# Patient Record
Sex: MALE | Race: WHITE | NOT HISPANIC OR LATINO | Employment: FULL TIME | URBAN - METROPOLITAN AREA
[De-identification: names, ages, dates, MRNs, and addresses within clinical notes are randomized per-mention and may not be internally consistent; named-entity substitution may affect disease eponyms.]

---

## 2017-01-12 ENCOUNTER — OFFICE VISIT (OUTPATIENT)
Dept: ORTHOPEDIC SURGERY | Facility: CLINIC | Age: 47
End: 2017-01-12

## 2017-01-12 VITALS — HEIGHT: 72 IN | BODY MASS INDEX: 42.66 KG/M2 | WEIGHT: 315 LBS | TEMPERATURE: 98 F

## 2017-01-12 DIAGNOSIS — S92.351A JONES FRACTURE, RIGHT, CLOSED, INITIAL ENCOUNTER: Primary | ICD-10-CM

## 2017-01-12 PROCEDURE — 73630 X-RAY EXAM OF FOOT: CPT | Performed by: ORTHOPAEDIC SURGERY

## 2017-01-12 PROCEDURE — 99024 POSTOP FOLLOW-UP VISIT: CPT | Performed by: ORTHOPAEDIC SURGERY

## 2017-01-12 NOTE — PROGRESS NOTES
Patient:  Sanjeev Farias is a 46 y.o. male    Chief Complaint/ Reason for Visit:    Chief Complaint   Patient presents with   • Right Foot - Pain, Follow-up       HPI:  The patient returns today and says that the pain and the swelling in his right foot have both improved.  In fact, he says that he has no pain at all in the right foot when weightbearing.  He has weaned himself from the crutches over the past couple of days.  He has not had any calf pain, chest pain, or shortness of breath.  He has not had fevers, chills, sweats, or shakes, and is not been recently systemically ill.  He has been compliant with my instructions for protecting his foot.    He sustained an on-the-job injury a few weeks ago when he was carrying a 50 pound box and was going up some stairs at work and twisted his right foot, feeling a pop in the immediate onset of sharp pain and swelling.      PMH:    Past Medical History   Diagnosis Date   • MRSA (methicillin resistant Staphylococcus aureus)        PSH:    Past Surgical History   Procedure Laterality Date   • Shoulder surgery     • Vasectomy     • Gastric banding     • Gastric banding removal         Social Hx:    Social History     Social History   • Marital status:      Spouse name: N/A   • Number of children: N/A   • Years of education: N/A     Occupational History   • Not on file.     Social History Main Topics   • Smoking status: Never Smoker   • Smokeless tobacco: Former User     Types: Chew   • Alcohol use Yes      Comment: occasional   • Drug use: No   • Sexual activity: Not on file     Other Topics Concern   • Not on file     Social History Narrative       Family Hx:    Family History   Problem Relation Age of Onset   • COPD Mother    • Cancer Father        Meds:    Current Outpatient Prescriptions:   •  diclofenac (VOLTAREN) 75 MG EC tablet, , Disp: , Rfl:   •  VICODIN 5-300 MG per tablet, Take 1 tablet by mouth Every 6 (Six) Hours As Needed for moderate pain (4-6)., Disp:  "50 tablet, Rfl: 0    Allergies:  No Known Allergies    ROS:  Review of Systems    Vitals:    01/12/17 1006   Temp: 98 °F (36.7 °C)   Weight: (!) 340 lb (154 kg)   Height: 72\" (182.9 cm)       Physical Exam    The patient is awake, alert, and oriented ×3.  The patient is in no acute distress.  Breathing is regular and unlabored with a respiratory rate of 12/m.  Extraocular movements and pupillary responses are symmetrically intact. Sclerae are anicteric.   Hearing is within normal limits.  Speech is within normal limits.  There is no jugular venous distention.    His right foot is inspected.  There is still mild swelling.  Has no tenderness in the metadiaphyseal region of the proximal right fifth metatarsal.  His right calf is soft and nontender, though he does have swelling in his right lower leg from the mid leg distally including the foot and ankle.  Sensory exam intact light touch in all toes, pedal pulses are intact and regular with normal amplitude, and a current heart rate of about 72 bpm.        Radiology: X-rays: 3 views of the right foot were ordered and reviewed today to assess fracture healing of the Leal type fracture of the right fifth metatarsal.  These were compared to the previous images.  The images today reveal the fracture is continuing to heal radiographically, though healing is not yet 100%.      Assessment:  1. Leal fracture, right, closed, subsequent encounter    - XR Foot 3+ View Right  - Duplex Venous Lower Extremity - Right CAR; Future              Plan:  I discussed everything with the patient at length.  I explained that the fracture had progressed and healing, but was not yet 100% healed.  I encouraged him to continue to use his crutches.  He voiced understanding.  I do not think there is any way he could safely return to full duty at a [employer].    Otherwise, he is going to continue his boot, and we will see him back in about a month for recheck.  All work restrictions remain in " force.  Since this is his right foot, he may not drive.  The patient voiced understanding.  We will need x-rays, 3 views of the right foot out of the boot, at follow-up.      Orders Placed This Encounter   Procedures   • XR Foot 3+ View Right     Order Specific Question:   Reason for Exam:     Answer:   f/u rt. foot

## 2017-01-12 NOTE — MR AVS SNAPSHOT
Sanjeev Farias   1/12/2017 9:30 AM   Office Visit    Dept Phone:  [phone]   Encounter #:  [id number]    Provider:  Timmy Villafana MD   Department:  [facility]                Your Full Care Plan              Your Updated Medication List          This list is accurate as of: 1/12/17 10:46 AM.  Always use your most recent med list.                diclofenac 75 MG EC tablet   Commonly known as:  VOLTAREN       VICODIN 5-300 MG per tablet   Generic drug:  Hydrocodone-Acetaminophen   Take 1 tablet by mouth Every 6 (Six) Hours As Needed for moderate pain (4-6).               We Performed the Following     XR Foot 3+ View Right       You Were Diagnosed With        Codes Comments    Leal fracture, right, closed, initial encounter    -  Primary ICD-10-CM: S92.351A  ICD-9-CM: 825.25       Instructions     None    Patient Instructions History      Upcoming Appointments     Visit Type Date Time Department    FOLLOW UP 1/12/2017  9:30 AM [facility]    FOLLOW UP 2/9/2017  9:40 AM [facility]      [employer] Signup     Our records indicate that you have an active [employer] account.    You can view your After Visit Summary by going to [employer] and logging in with your [employer] username and password.  If you don't have a [employer] username and password but a parent or guardian has access to your record, the parent or guardian should login with their own [employer] username and password and access your record to view the After Visit Summary.    If you have questions, you can email [email or web address] or call [phone] to talk to our [employer] staff.  Remember, [employer] is NOT to be used for urgent needs.  For medical emergencies, dial 911.               Other Info from Your Visit           Your Appointments     Feb 09, 2017  9:40 AM EST   Follow Up with Timmy Villafana MD   [facility]  "[facility] (--)    [address] [place] [address]   [phone]           Arrive 15 minutes prior to appointment.              Allergies     No Known Allergies      Reason for Visit     Right Foot - Pain, Follow-up           Vital Signs     Temperature Height Weight Body Mass Index Smoking Status       98 °F (36.7 °C) 72\" (182.9 cm) 340 lb (154 kg) 46.11 kg/m2 Never Smoker       Problems and Diagnoses Noted     Broken foot        "

## 2017-02-09 ENCOUNTER — OFFICE VISIT (OUTPATIENT)
Dept: ORTHOPEDIC SURGERY | Facility: CLINIC | Age: 47
End: 2017-02-09

## 2017-02-09 VITALS — BODY MASS INDEX: 45.1 KG/M2 | WEIGHT: 315 LBS | HEIGHT: 70 IN | TEMPERATURE: 98.6 F

## 2017-02-09 DIAGNOSIS — S92.351G: Primary | ICD-10-CM

## 2017-02-09 PROCEDURE — 73630 X-RAY EXAM OF FOOT: CPT | Performed by: ORTHOPAEDIC SURGERY

## 2017-02-09 PROCEDURE — 99024 POSTOP FOLLOW-UP VISIT: CPT | Performed by: ORTHOPAEDIC SURGERY

## 2017-02-09 RX ORDER — PHENTERMINE HYDROCHLORIDE 30 MG/1
30 CAPSULE ORAL EVERY MORNING
COMMUNITY
End: 2017-04-17

## 2017-02-09 NOTE — PROGRESS NOTES
Patient:  Sanjeev Farias is a 47 y.o. male    Chief Complaint/ Reason for Visit:    Chief Complaint   Patient presents with   • Right Foot - Fracture, Follow-up       HPI:  The patient returns today for scheduled follow-up on his right foot fifth metatarsal Leal fracture.  It has now been 3 months, perhaps a little more, since his injury, which occurred on the job.  He says that he has little to no pain.  He has had no calf pain, chest pain, or shortness of breath.  He has been compliant with my instructions and restrictions.  He has been off work as his restrictions could not be accommodated.      PMH:    Past Medical History   Diagnosis Date   • MRSA (methicillin resistant Staphylococcus aureus)        PSH:    Past Surgical History   Procedure Laterality Date   • Shoulder surgery     • Vasectomy     • Gastric banding     • Gastric banding removal         Social Hx:    Social History     Social History   • Marital status:      Spouse name: N/A   • Number of children: N/A   • Years of education: N/A     Occupational History   • Not on file.     Social History Main Topics   • Smoking status: Never Smoker   • Smokeless tobacco: Former User     Types: Chew   • Alcohol use Yes      Comment: occasional   • Drug use: No   • Sexual activity: Defer     Other Topics Concern   • Not on file     Social History Narrative       Family Hx:    Family History   Problem Relation Age of Onset   • COPD Mother    • Cancer Father        Meds:    Current Outpatient Prescriptions:   •  phentermine 30 MG capsule, Take 30 mg by mouth Every Morning., Disp: , Rfl:   •  diclofenac (VOLTAREN) 75 MG EC tablet, , Disp: , Rfl:   •  VICODIN 5-300 MG per tablet, Take 1 tablet by mouth Every 6 (Six) Hours As Needed for moderate pain (4-6)., Disp: 50 tablet, Rfl: 0    Allergies:  No Known Allergies    ROS:  Review of Systems    Vitals:    02/09/17 1038   Temp: 98.6 °F (37 °C)   TempSrc: Temporal Artery    Weight: (!) 341 lb 9.6 oz (155 kg)  "  Height: 70\" (177.8 cm)       Physical Exam    The patient is awake, alert, and oriented ×3.  The patient is in no acute distress.  Breathing is regular and unlabored with a respiratory rate of 14/m.  Extraocular movements and pupillary responses are symmetrically intact. Sclerae are anicteric.   Hearing is within normal limits.  Speech is within normal limits.  There is no jugular venous distention.    His right foot has minimal swelling.  He has no tenderness, crepitus, or instability and near the fifth metatarsal Leal fracture.  Alignment, rotation, and cascade of his toes appear normal.  Pedal pulses are intact and regular.  Sensory exam intact light touch in all toes.  His calf is soft and nontender with no venous cord.    Radiology: X-rays: 3 views of the right foot were ordered and reviewed today to assess fracture alignment and healing.  These images were compared to multiple series of images previously here in the office.  The fracture has continued to heal, but it is not yet 100% healed.    Labs:      Assessment:  1. Leal fracture, right, with delayed healing, subsequent encounter    - XR Foot 3+ View Right          Plan:  I discussed everything with the patient, and, in fact, reviewed the images with him personally.  I explained that he was healing this fracture but there was not all the way healed.  I do not think he could safely return to his rigorous job full duty at this time.  We certainly don't want to come this close to healing the fracture, and have him reinjure it.    Technically, he has delayed healing.  However, I've reassured him that this is the rule, rather than the exception, with Leal fractures.  Obviously, he would like to avoid surgery if possible.  We are therefore going to have to continue his boot and activity and weightbearing restrictions.  He may not return to work unless they can provide him with sedentary duty.  I will see him back in another 5 weeks and we will need x-rays, " 3 views of the right foot.    Orders Placed This Encounter   Procedures   • XR Foot 3+ View Right     Order Specific Question:   Reason for Exam:     Answer:   f/u right foot

## 2017-03-20 ENCOUNTER — OFFICE VISIT (OUTPATIENT)
Dept: ORTHOPEDIC SURGERY | Facility: CLINIC | Age: 47
End: 2017-03-20

## 2017-03-20 VITALS — TEMPERATURE: 99.4 F | WEIGHT: 315 LBS | HEIGHT: 70 IN | BODY MASS INDEX: 45.1 KG/M2

## 2017-03-20 DIAGNOSIS — S92.351D: Primary | ICD-10-CM

## 2017-03-20 PROCEDURE — 99213 OFFICE O/P EST LOW 20 MIN: CPT | Performed by: ORTHOPAEDIC SURGERY

## 2017-03-20 PROCEDURE — 73630 X-RAY EXAM OF FOOT: CPT | Performed by: ORTHOPAEDIC SURGERY

## 2017-03-20 NOTE — PROGRESS NOTES
Patient:  Sanjeev Farias is a 47 y.o. male    Chief Complaint/ Reason for Visit:    Chief Complaint   Patient presents with   • Right Foot - Follow-up       HPI:  The patient returns today, accompanied by his work comp  nurse, for a scheduled follow-up on his right foot Leal fracture.  It is now been about 4-1/2 months since he sustained this injury in an on-the-job event.  His pain has continued to improve, but not as much since the last visit as it had between the previous 2 visits.  He still is very comfortable in his boot but is very concerned when he walks without it, such as going to the shower in the morning, that he still has some discomfort laterally.  He has not had any calf pain, chest pain, or shortness of breath.    He has a very strenuous, physical job and is concerned that his foot may not yet be ready to return.  He has an understandable concerned about refracturing the right fifth metatarsal, and really wants to avoid that and the likely surgery that it would require should this occur.          PMH:    Past Medical History   Diagnosis Date   • MRSA (methicillin resistant Staphylococcus aureus)        PSH:    Past Surgical History   Procedure Laterality Date   • Shoulder surgery     • Vasectomy     • Gastric banding     • Gastric banding removal         Social Hx:    Social History     Social History   • Marital status:      Spouse name: N/A   • Number of children: N/A   • Years of education: N/A     Occupational History   • Not on file.     Social History Main Topics   • Smoking status: Never Smoker   • Smokeless tobacco: Former User     Types: Chew   • Alcohol use Yes      Comment: occasional   • Drug use: No   • Sexual activity: Defer     Other Topics Concern   • Not on file     Social History Narrative       Family Hx:    Family History   Problem Relation Age of Onset   • COPD Mother    • Cancer Father        Meds:    Current Outpatient Prescriptions:   •  phentermine 30 MG  "capsule, Take 30 mg by mouth Every Morning., Disp: , Rfl:   •  diclofenac (VOLTAREN) 75 MG EC tablet, , Disp: , Rfl:   •  VICODIN 5-300 MG per tablet, Take 1 tablet by mouth Every 6 (Six) Hours As Needed for moderate pain (4-6)., Disp: 50 tablet, Rfl: 0    Allergies:  No Known Allergies    ROS:  Review of Systems    Vitals:    03/20/17 1126   Temp: 99.4 °F (37.4 °C)   TempSrc: Temporal Artery    Weight: (!) 341 lb (155 kg)   Height: 70\" (177.8 cm)       Physical Exam    The patient is awake, alert, and oriented ×3.  The patient is in no acute distress.  Breathing is regular and unlabored with a respiratory rate of 14/m.  Extraocular movements and pupillary responses are symmetrically intact. Sclerae are anicteric.   Hearing is within normal limits.  Speech is within normal limits.  There is no jugular venous distention.    His right foot is inspected.  There is no swelling.  His right calf is soft, nontender, with no venous cord.  He has expected muscular atrophy to a mild degree.  Alignment, rotation, and cascade of the toes in the right foot are all appear normal.  There is no crepitus or instability, and in fact, no tenderness in the area of the proximal right fifth metatarsal.  He has no tenderness elsewhere.  Pulses and sensory exam remain intact in the right lower extremity.        Radiology: X-rays: 3 views of the right foot were ordered and reviewed to assess fracture alignment and healing, and were compared to multiple series of images of the right foot also taken here in the office during past visits.  The right fifth metatarsal Leal fracture has almost completely healed.  There has been subtle progression of healing noted when I compared today's images to the previous images from the last visit.  There is been considerable healing notable on today's images compared to the images from January.          Assessment:  1. Leal fracture, right, with routine healing, subsequent encounter    - XR Foot 3+ View " Right  - Ambulatory Referral to Physical Therapy Evaluate and treat, Ortho          Plan:  I discussed everything with the patient and his work comp nurse.  I reviewed the x-rays personally with the patient, and showed him the comparison images, explaining everything to him, and answering all of his questions.    He has a very strenuous, vigorous job, and is reluctant to return yet.  I agree that we should keep him off work for probably another month, and begin some work hardening with physical therapy.  He is in agreement, as is his work comp nurse.    Appropriate referral was generated.    I'll see him back in about a month in hopes of returning him to work at that time.    Orders Placed This Encounter   Procedures   • XR Foot 3+ View Right     Order Specific Question:   Reason for Exam:     Answer:   foot   • Ambulatory Referral to Physical Therapy Evaluate and treat, Ortho     Referral Priority:   Routine     Referral Type:   Therapy     Referral Reason:   Specialty Services Required     Referral Location:   [facility]     Requested Specialty:   Physical Therapy     Number of Visits Requested:   1

## 2017-03-23 DIAGNOSIS — S92.351D: Primary | ICD-10-CM

## 2017-04-17 ENCOUNTER — OFFICE VISIT (OUTPATIENT)
Dept: ORTHOPEDIC SURGERY | Facility: CLINIC | Age: 47
End: 2017-04-17

## 2017-04-17 VITALS — WEIGHT: 315 LBS | HEIGHT: 70 IN | BODY MASS INDEX: 45.1 KG/M2 | TEMPERATURE: 98.4 F

## 2017-04-17 DIAGNOSIS — S92.351D: Primary | ICD-10-CM

## 2017-04-17 PROCEDURE — 73630 X-RAY EXAM OF FOOT: CPT | Performed by: ORTHOPAEDIC SURGERY

## 2017-04-17 PROCEDURE — 99213 OFFICE O/P EST LOW 20 MIN: CPT | Performed by: ORTHOPAEDIC SURGERY

## 2017-04-17 NOTE — PROGRESS NOTES
"Patient:  Sanjeev Farias is a 47 y.o. male    Chief Complaint/ Reason for Visit:    Chief Complaint   Patient presents with   • Right Foot - Follow-up       HPI:  Patient is here with his work comp nurse, Aida Villalobos, for scheduled follow-up on the work comp Leal fracture he sustained to his right foot.  He says he has no pain.  He has been doing well physical therapy.  He does desire to return to work.  He has had no calf pain, chest pain, or shortness of breath.    He does work in a [employer].      PMH:    Past Medical History:   Diagnosis Date   • MRSA (methicillin resistant Staphylococcus aureus)        PSH:    Past Surgical History:   Procedure Laterality Date   • GASTRIC BANDING     • GASTRIC BANDING REMOVAL     • SHOULDER SURGERY     • VASECTOMY         Social Hx:    Social History     Social History   • Marital status:      Spouse name: N/A   • Number of children: N/A   • Years of education: N/A     Occupational History   • Not on file.     Social History Main Topics   • Smoking status: Never Smoker   • Smokeless tobacco: Former User     Types: Chew   • Alcohol use Yes      Comment: occasional   • Drug use: No   • Sexual activity: Defer     Other Topics Concern   • Not on file     Social History Narrative       Family Hx:    Family History   Problem Relation Age of Onset   • COPD Mother    • Cancer Father        Meds:  No current outpatient prescriptions on file.    Allergies:  No Known Allergies    ROS:  Review of Systems    Vitals:    04/17/17 1147   Temp: 98.4 °F (36.9 °C)   TempSrc: Temporal Artery    Weight: (!) 315 lb (143 kg)   Height: 70\" (177.8 cm)       Physical Exam    The patient is awake, alert, and oriented ×3.  The patient is in no acute distress.  Breathing is regular and unlabored with a respiratory rate of 12/m.  Extraocular movements and pupillary responses are symmetrically intact. Sclerae are anicteric.   Hearing is within normal limits.  Speech is within normal " limits.  There is no jugular venous distention.    He has a lumbering gait.  He has no antalgia.  His right foot exam today is normal.  His skin is intact.  He has no crepitus.  His pulses are intact and regular.  His right calf is soft and nontender with no venous cord.        Radiology: X-rays: 3 views of his right foot were ordered and reviewed today to assess fracture alignment and healing.  I did compare these to multiple series of previous images also available in the office.  The patient has shown steady progression of healing.  That I believe the fracture of the right fifth metatarsal was now completely healed.        Assessment:  1. Leal fracture, right, with routine healing, subsequent encounter    - XR Foot 3+ View Right          Plan:  I discussed everything with the patient in the work comp nurse.  I do think it's fine for him to return to work.  I think he needs to continue physical therapy for help with balance and proprioception and agility in hopes of reducing the likelihood of future injuries.    I want to see him back in about 6 weeks for what will hopefully be a final check.

## 2017-05-18 ENCOUNTER — TELEPHONE (OUTPATIENT)
Dept: ORTHOPEDIC SURGERY | Facility: CLINIC | Age: 47
End: 2017-05-18

## 2017-05-25 ENCOUNTER — OFFICE VISIT (OUTPATIENT)
Dept: ORTHOPEDIC SURGERY | Facility: CLINIC | Age: 47
End: 2017-05-25

## 2017-05-25 VITALS — WEIGHT: 315 LBS | BODY MASS INDEX: 45.1 KG/M2 | TEMPERATURE: 98.2 F | HEIGHT: 70 IN

## 2017-05-25 DIAGNOSIS — S92.351D: Primary | ICD-10-CM

## 2017-05-25 PROCEDURE — 73630 X-RAY EXAM OF FOOT: CPT | Performed by: ORTHOPAEDIC SURGERY

## 2017-05-25 PROCEDURE — 99213 OFFICE O/P EST LOW 20 MIN: CPT | Performed by: ORTHOPAEDIC SURGERY

## 2017-07-07 ENCOUNTER — OFFICE VISIT (OUTPATIENT)
Dept: ORTHOPEDIC SURGERY | Facility: CLINIC | Age: 47
End: 2017-07-07

## 2017-07-07 VITALS — WEIGHT: 310 LBS | HEIGHT: 70 IN | BODY MASS INDEX: 44.38 KG/M2 | TEMPERATURE: 98.6 F

## 2017-07-07 DIAGNOSIS — S92.351D: Primary | ICD-10-CM

## 2017-07-07 PROCEDURE — 99213 OFFICE O/P EST LOW 20 MIN: CPT | Performed by: ORTHOPAEDIC SURGERY

## 2017-07-07 PROCEDURE — 73630 X-RAY EXAM OF FOOT: CPT | Performed by: ORTHOPAEDIC SURGERY

## 2017-07-07 NOTE — PROGRESS NOTES
"Patient:  Sanjeev Farias is a 47 y.o. male    Chief Complaint/ Reason for Visit:    Chief Complaint   Patient presents with   • Right Foot - Follow-up       HPI:  The patient is here for scheduled follow-up on the work comp injury he sustained to his right fifth metatarsal.  This was a Leal fracture which we seem to have been able to treat nonsurgically.  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  He has been able to work comfortably his work boots, and walks in the office today and athletic shoes with no limp.  He has no calf pain, chest pain, or shortness of breath.  He has not yet been fitted for his orthotics.          PMH:    Past Medical History:   Diagnosis Date   • MRSA (methicillin resistant Staphylococcus aureus)        PSH:    Past Surgical History:   Procedure Laterality Date   • GASTRIC BANDING     • GASTRIC BANDING REMOVAL     • SHOULDER SURGERY     • VASECTOMY         Social Hx:    Social History     Social History   • Marital status:      Spouse name: N/A   • Number of children: N/A   • Years of education: N/A     Occupational History   • Not on file.     Social History Main Topics   • Smoking status: Never Smoker   • Smokeless tobacco: Former User     Types: Chew   • Alcohol use Yes      Comment: occasional   • Drug use: Defer   • Sexual activity: Defer     Other Topics Concern   • Not on file     Social History Narrative       Family Hx:    Family History   Problem Relation Age of Onset   • COPD Mother    • Cancer Father        Meds:  No current outpatient prescriptions on file.    Allergies:  No Known Allergies    ROS:  Review of Systems    Vitals:    07/07/17 1114   Temp: 98.6 °F (37 °C)   Weight: (!) 310 lb (141 kg)   Height: 70\" (177.8 cm)       Physical Exam    The patient is awake, alert, and oriented ×3.  The patient is in no acute distress.  Breathing is regular and unlabored with a respiratory rate of 12/m.  Extraocular movements and pupillary " responses are symmetrically intact. Sclerae are anicteric.   Hearing is within normal limits.  Speech is within normal limits.  There is no jugular venous distention.    He has a smooth symmetrical, brisk, heel toe gait with no pronounced limp.  His right foot is inspected.  I see no abnormal swelling, bruising, or other discoloration.  He has no tenderness in the area of his fifth metatarsal fracture in the metadiaphyseal junction.    Neurovascular exam is intact and stable.      Radiology: X-rays: 3 views the patient's right foot were ordered and reviewed today to assess fracture alignment and healing.  I did compare these to the previous images from May.  On the AP and lateral views, healing appears satisfactory.  There does appear to be bridging callus.  Interestingly, on the oblique view, there appears as though there may have been some resorption of bone at the fracture site.  I'm not sure if this is a projection abnormality but it certainly is unusual.        Assessment:  1. Leal fracture, right, with routine healing, subsequent encounter    - XR Foot 3+ View Right          Plan:  I discussed everything with the patient, as well as the work comp , Aida, who is here throughout the visit.  He seems to be able to tolerate his job and the demands thereof, so I see no reason to restrict him.  However, I want to keep a close eye on his foot, especially given the difficult nature of Leal fractures.  I want to see him back in about 6 weeks.  We will need x-rays, 3 views the right foot at that time.    In the meantime, hopefully the patient will receive the orthotics we have ordered.      Orders Placed This Encounter   Procedures   • XR Foot 3+ View Right     Order Specific Question:   Reason for Exam:     Answer:   F/U RT. FOOT

## 2017-08-18 ENCOUNTER — OFFICE VISIT (OUTPATIENT)
Dept: ORTHOPEDIC SURGERY | Facility: CLINIC | Age: 47
End: 2017-08-18

## 2017-08-18 VITALS — WEIGHT: 290 LBS | TEMPERATURE: 98.2 F | BODY MASS INDEX: 40.6 KG/M2 | HEIGHT: 71 IN

## 2017-08-18 DIAGNOSIS — S92.351D: Primary | ICD-10-CM

## 2017-08-18 PROCEDURE — 73630 X-RAY EXAM OF FOOT: CPT | Performed by: ORTHOPAEDIC SURGERY

## 2017-08-18 PROCEDURE — 99213 OFFICE O/P EST LOW 20 MIN: CPT | Performed by: ORTHOPAEDIC SURGERY

## 2017-08-18 NOTE — PROGRESS NOTES
"Patient:  Sanjeev Farias is a 47 y.o. male    Chief Complaint/ Reason for Visit:    Chief Complaint   Patient presents with   • Right Foot - Follow-up       HPI:  The patient is here for scheduled follow-up on a work comp fracture of his proximal right fifth metatarsal.  He says that the doesn't have any pain per se, but says that he can still \"feel it\" when he is descending stairs.  He has a discomfort in the lateral aspect of the right foot when going downstairs.  Occasionally it happens when going up stairs.  He says that really the only time he notices the fact that he has had an injury.  He has not had any calf pain chest pain or shortness of breath.  Otherwise he can walk and do his activities daily living and can function in his job under the current circumstances without difficulty.      PMH:    Past Medical History:   Diagnosis Date   • MRSA (methicillin resistant Staphylococcus aureus)        PSH:    Past Surgical History:   Procedure Laterality Date   • GASTRIC BANDING     • GASTRIC BANDING REMOVAL     • SHOULDER SURGERY     • VASECTOMY         Social Hx:    Social History     Social History   • Marital status:      Spouse name: N/A   • Number of children: N/A   • Years of education: N/A     Occupational History   • Not on file.     Social History Main Topics   • Smoking status: Never Smoker   • Smokeless tobacco: Former User     Types: Chew   • Alcohol use Yes      Comment: occasional   • Drug use: Defer   • Sexual activity: Defer     Other Topics Concern   • Not on file     Social History Narrative       Family Hx:    Family History   Problem Relation Age of Onset   • COPD Mother    • Cancer Father        Meds:  No current outpatient prescriptions on file.    Allergies:  No Known Allergies    ROS:  Review of Systems    Vitals:    08/18/17 0759   Temp: 98.2 °F (36.8 °C)   Weight: 290 lb (132 kg)   Height: 71\" (180.3 cm)       Physical Exam    The patient is awake, alert, and oriented ×3.  The " patient is in no acute distress.  Breathing is regular and unlabored with a respiratory rate of 12/m.  Extraocular movements and pupillary responses are symmetrically intact. Sclerae are anicteric.   Hearing is within normal limits.  Speech is within normal limits.  There is no jugular venous distention.    He walks with just the slightest limp antalgic from the right.  I think this is learned as he denies pain when he is walking and standing.  His right foot is inspected.  There is minimal swelling laterally.  He doesn't have any tenderness on palpation in the area of the fifth metatarsal fracture.  Alignment, rotation, and cascade of the toes appear normal.  Pulses and sensory exam are also normal.  His right calf is soft and nontender with no venous cord.        Radiology: X-rays: 3 views the right foot were ordered and reviewed to assess fracture alignment and healing.  These were compared to multiple images over time.  In comparison to the most recent images it can be seen that he is continuing to slowly but steadily advance healing across this fifth metatarsal fracture.  No pop getting features are noted.  I reviewed the images with the patient.          Assessment:  1. Leal fracture, right, with routine healing, subsequent encounter    - XR Foot 3+ View Right          Plan:  As I have discussed with the patient, and the work comp nurse Aida Villalobos, it is often the rule rather than the exception that these types of fractures heal slowly.  Given his slow but steady improvement as well as radiographic improvement I anticipate continued improvement with our current treatment.  However his work restrictions must remain exactly the same.  As long as he is having any symptoms at all, we cannot consider this fracture healed, especially given this particular fracture's predilection to be problematic.      Orders Placed This Encounter   Procedures   • XR Foot 3+ View Right     Order Specific Question:   Reason for  Exam:     Answer:   right foot

## 2017-08-22 ENCOUNTER — TELEPHONE (OUTPATIENT)
Dept: ORTHOPEDIC SURGERY | Facility: CLINIC | Age: 47
End: 2017-08-22

## 2017-09-29 ENCOUNTER — OFFICE VISIT (OUTPATIENT)
Dept: ORTHOPEDIC SURGERY | Facility: CLINIC | Age: 47
End: 2017-09-29

## 2017-09-29 VITALS — HEIGHT: 70 IN | WEIGHT: 315 LBS | TEMPERATURE: 99.5 F | BODY MASS INDEX: 45.1 KG/M2

## 2017-09-29 DIAGNOSIS — S92.351D: Primary | ICD-10-CM

## 2017-09-29 PROCEDURE — 73630 X-RAY EXAM OF FOOT: CPT | Performed by: ORTHOPAEDIC SURGERY

## 2017-09-29 PROCEDURE — 99212 OFFICE O/P EST SF 10 MIN: CPT | Performed by: ORTHOPAEDIC SURGERY

## 2017-09-29 NOTE — PROGRESS NOTES
"Patient:  Sanjeev Farias is a 47 y.o. male    Chief Complaint/ Reason for Visit:    Chief Complaint   Patient presents with   • Right Foot - Follow-up       HPI:  This gentleman is here, accompanied by the work comp nurse, for scheduled follow-up on his work-related right fifth metatarsal Leal fracture.  He says that since the last office visit he has had no pain.  He has been able to execute the duties of his job without any problems, and also has been able to play golf without any problems.  He has no pain, no swelling, and no other concerning complaints or issues with respect to his right foot.      PMH:    Past Medical History:   Diagnosis Date   • MRSA (methicillin resistant Staphylococcus aureus)        PSH:    Past Surgical History:   Procedure Laterality Date   • GASTRIC BANDING     • GASTRIC BANDING REMOVAL     • SHOULDER SURGERY     • VASECTOMY         Social Hx:    Social History     Social History   • Marital status:      Spouse name: N/A   • Number of children: N/A   • Years of education: N/A     Occupational History   • Not on file.     Social History Main Topics   • Smoking status: Never Smoker   • Smokeless tobacco: Former User     Types: Chew   • Alcohol use Yes      Comment: occasional   • Drug use: Defer   • Sexual activity: Defer     Other Topics Concern   • Not on file     Social History Narrative       Family Hx:    Family History   Problem Relation Age of Onset   • COPD Mother    • Cancer Father        Meds:  No current outpatient prescriptions on file.    Allergies:  No Known Allergies    ROS:  Review of Systems    Vitals:    09/29/17 0816   Temp: 99.5 °F (37.5 °C)   Weight: (!) 315 lb (143 kg)   Height: 70\" (177.8 cm)   Body mass index is 45.2 kg/(m^2).      Physical Exam    The patient is awake, alert, and oriented ×3.  The patient is in no acute distress.  Breathing is regular and unlabored with a respiratory rate of 12/m.  Extraocular movements and pupillary responses are " symmetrically intact. Sclerae are anicteric.   Hearing is within normal limits.  Speech is within normal limits.  There is no jugular venous distention.    His right foot looks fine.  In fact, it looks essentially the same as the left foot.  There is no swelling, bruising, discoloration, or any externally visible abnormalities.  Pulses and sensory exam are normal.  Range of motion normal.  Motor strength 5 over 5 throughout the right lower extremity.  He has no tenderness, crepitus, or instability in the area of his right proximal fifth metatarsal fracture.    Radiology: X-rays: 3 views the patient's right foot were ordered and reviewed today to assess fracture healing.  I did review these and compare them to multiple images of his right foot we have over the past several months.  Today, the fracture appears to be 100% healed.  I see no other concerning findings on the right foot.  In comparison is clear the patient has slowly but steadily advance his healing and callus formation in the proximal right fifth metatarsal fracture.      Assessment:  1. Leal fracture, right, with routine healing, subsequent encounter    - XR Foot 3+ View Right          Plan:  I discussed everything with the patient and with his work comp case nurse.  I think that he has achieved M MRI.  I do not think there is any PPI.  There are no work restrictions at this point.  Follow-up is as needed.  He has clearance to return to work full duty.      Orders Placed This Encounter   Procedures   • XR Foot 3+ View Right     Order Specific Question:   Reason for Exam:     Answer:   f/u wc injury

## 2018-04-02 ENCOUNTER — HOSPITAL ENCOUNTER (EMERGENCY)
Facility: HOSPITAL | Age: 48
Discharge: HOME | End: 2018-04-02
Attending: FAMILY MEDICINE | Admitting: FAMILY MEDICINE

## 2018-04-02 ENCOUNTER — APPOINTMENT (OUTPATIENT)
Dept: GENERAL RADIOLOGY | Facility: HOSPITAL | Age: 48
End: 2018-04-02

## 2018-04-02 VITALS
HEIGHT: 71 IN | WEIGHT: 315 LBS | BODY MASS INDEX: 44.1 KG/M2 | HEART RATE: 101 BPM | RESPIRATION RATE: 20 BRPM | OXYGEN SATURATION: 93 % | TEMPERATURE: 98.9 F | DIASTOLIC BLOOD PRESSURE: 64 MMHG | SYSTOLIC BLOOD PRESSURE: 142 MMHG

## 2018-04-02 DIAGNOSIS — R07.89 ATYPICAL CHEST PAIN: Primary | ICD-10-CM

## 2018-04-02 DIAGNOSIS — R06.00 DYSPNEA, UNSPECIFIED TYPE: ICD-10-CM

## 2018-04-02 LAB
ALBUMIN SERPL-MCNC: 3.7 G/DL (ref 3.5–5.2)
ALBUMIN/GLOB SERPL: 1.3 G/DL
ALP SERPL-CCNC: 57 U/L (ref 39–117)
ALT SERPL W P-5'-P-CCNC: 19 U/L (ref 1–41)
ANION GAP SERPL CALCULATED.3IONS-SCNC: 8.7 MMOL/L
AST SERPL-CCNC: 14 U/L (ref 1–40)
BASOPHILS # BLD AUTO: 0.02 10*3/MM3 (ref 0–0.2)
BASOPHILS NFR BLD AUTO: 0.3 % (ref 0–1.5)
BILIRUB SERPL-MCNC: 0.6 MG/DL (ref 0.1–1.2)
BUN BLD-MCNC: 16 MG/DL (ref 6–20)
BUN/CREAT SERPL: 17 (ref 7–25)
CALCIUM SPEC-SCNC: 8.6 MG/DL (ref 8.6–10.5)
CHLORIDE SERPL-SCNC: 100 MMOL/L (ref 98–107)
CO2 SERPL-SCNC: 30.3 MMOL/L (ref 22–29)
CREAT BLD-MCNC: 0.94 MG/DL (ref 0.76–1.27)
D DIMER PPP FEU-MCNC: 0.32 MCGFEU/ML (ref 0–0.49)
DEPRECATED RDW RBC AUTO: 46.8 FL (ref 37–54)
EOSINOPHIL # BLD AUTO: 0.1 10*3/MM3 (ref 0–0.7)
EOSINOPHIL NFR BLD AUTO: 1.3 % (ref 0.3–6.2)
ERYTHROCYTE [DISTWIDTH] IN BLOOD BY AUTOMATED COUNT: 13.2 % (ref 11.5–14.5)
GFR SERPL CREATININE-BSD FRML MDRD: 86 ML/MIN/1.73
GLOBULIN UR ELPH-MCNC: 2.8 GM/DL
GLUCOSE BLD-MCNC: 105 MG/DL (ref 65–99)
HCT VFR BLD AUTO: 46.9 % (ref 40.4–52.2)
HGB BLD-MCNC: 15.3 G/DL (ref 13.7–17.6)
HOLD SPECIMEN: NORMAL
HOLD SPECIMEN: NORMAL
IMM GRANULOCYTES # BLD: 0.25 10*3/MM3 (ref 0–0.03)
IMM GRANULOCYTES NFR BLD: 3.1 % (ref 0–0.5)
LYMPHOCYTES # BLD AUTO: 2.1 10*3/MM3 (ref 0.9–4.8)
LYMPHOCYTES NFR BLD AUTO: 26.4 % (ref 19.6–45.3)
MCH RBC QN AUTO: 31.7 PG (ref 27–32.7)
MCHC RBC AUTO-ENTMCNC: 32.6 G/DL (ref 32.6–36.4)
MCV RBC AUTO: 97.3 FL (ref 79.8–96.2)
MONOCYTES # BLD AUTO: 0.97 10*3/MM3 (ref 0.2–1.2)
MONOCYTES NFR BLD AUTO: 12.2 % (ref 5–12)
NEUTROPHILS # BLD AUTO: 4.51 10*3/MM3 (ref 1.9–8.1)
NEUTROPHILS NFR BLD AUTO: 56.7 % (ref 42.7–76)
PLATELET # BLD AUTO: 135 10*3/MM3 (ref 140–500)
PMV BLD AUTO: 10.5 FL (ref 6–12)
POTASSIUM BLD-SCNC: 4.2 MMOL/L (ref 3.5–5.2)
PROT SERPL-MCNC: 6.5 G/DL (ref 6–8.5)
RBC # BLD AUTO: 4.82 10*6/MM3 (ref 4.6–6)
SODIUM BLD-SCNC: 139 MMOL/L (ref 136–145)
TROPONIN T SERPL-MCNC: <0.01 NG/ML (ref 0–0.03)
WBC NRBC COR # BLD: 7.95 10*3/MM3 (ref 4.5–10.7)
WHOLE BLOOD HOLD SPECIMEN: NORMAL
WHOLE BLOOD HOLD SPECIMEN: NORMAL

## 2018-04-02 PROCEDURE — 85025 COMPLETE CBC W/AUTO DIFF WBC: CPT | Performed by: NURSE PRACTITIONER

## 2018-04-02 PROCEDURE — 85379 FIBRIN DEGRADATION QUANT: CPT | Performed by: NURSE PRACTITIONER

## 2018-04-02 PROCEDURE — 84484 ASSAY OF TROPONIN QUANT: CPT | Performed by: NURSE PRACTITIONER

## 2018-04-02 PROCEDURE — 99284 EMERGENCY DEPT VISIT MOD MDM: CPT

## 2018-04-02 PROCEDURE — 94799 UNLISTED PULMONARY SVC/PX: CPT

## 2018-04-02 PROCEDURE — 93005 ELECTROCARDIOGRAM TRACING: CPT | Performed by: FAMILY MEDICINE

## 2018-04-02 PROCEDURE — 25010000002 METHYLPREDNISOLONE PER 125 MG: Performed by: NURSE PRACTITIONER

## 2018-04-02 PROCEDURE — 71046 X-RAY EXAM CHEST 2 VIEWS: CPT

## 2018-04-02 PROCEDURE — 80053 COMPREHEN METABOLIC PANEL: CPT | Performed by: NURSE PRACTITIONER

## 2018-04-02 PROCEDURE — 94640 AIRWAY INHALATION TREATMENT: CPT

## 2018-04-02 PROCEDURE — 93005 ELECTROCARDIOGRAM TRACING: CPT

## 2018-04-02 PROCEDURE — 96374 THER/PROPH/DIAG INJ IV PUSH: CPT

## 2018-04-02 PROCEDURE — 93010 ELECTROCARDIOGRAM REPORT: CPT | Performed by: INTERNAL MEDICINE

## 2018-04-02 RX ORDER — SODIUM CHLORIDE 0.9 % (FLUSH) 0.9 %
10 SYRINGE (ML) INJECTION AS NEEDED
Status: DISCONTINUED | OUTPATIENT
Start: 2018-04-02 | End: 2018-04-02 | Stop reason: HOSPADM

## 2018-04-02 RX ORDER — ALBUTEROL SULFATE 2.5 MG/3ML
2.5 SOLUTION RESPIRATORY (INHALATION)
Status: COMPLETED | OUTPATIENT
Start: 2018-04-02 | End: 2018-04-02

## 2018-04-02 RX ORDER — DIPHENHYDRAMINE HCL 25 MG
25 TABLET ORAL EVERY 6 HOURS PRN
COMMUNITY

## 2018-04-02 RX ORDER — HYDROXYZINE 50 MG/1
50 TABLET, FILM COATED ORAL 3 TIMES DAILY PRN
COMMUNITY

## 2018-04-02 RX ORDER — METHYLPREDNISOLONE SODIUM SUCCINATE 125 MG/2ML
125 INJECTION, POWDER, LYOPHILIZED, FOR SOLUTION INTRAMUSCULAR; INTRAVENOUS ONCE
Status: COMPLETED | OUTPATIENT
Start: 2018-04-02 | End: 2018-04-02

## 2018-04-02 RX ORDER — PREDNISONE 20 MG/1
20 TABLET ORAL DAILY
COMMUNITY

## 2018-04-02 RX ADMIN — METHYLPREDNISOLONE SODIUM SUCCINATE 125 MG: 125 INJECTION, POWDER, FOR SOLUTION INTRAMUSCULAR; INTRAVENOUS at 09:17

## 2018-04-02 RX ADMIN — ALBUTEROL SULFATE 2.5 MG: 2.5 SOLUTION RESPIRATORY (INHALATION) at 09:01

## 2018-04-02 RX ADMIN — ALBUTEROL SULFATE 2.5 MG: 2.5 SOLUTION RESPIRATORY (INHALATION) at 09:14

## 2018-04-02 RX ADMIN — ALBUTEROL SULFATE 2.5 MG: 2.5 SOLUTION RESPIRATORY (INHALATION) at 09:28

## 2018-04-02 NOTE — ED PROVIDER NOTES
MD ATTESTATION NOTE    The STEPHEN and I have discussed this patient's history, physical exam, and treatment plan.  I have reviewed the documentation and personally had a face to face interaction with the patient. I affirm the documentation and agree with the treatment and plan.  The attached note describes my personal findings.    Pt presents to ED with c/o SOA, fatigue, and constant pleuritic CP for 4 days. He states that this started with a rash after finishing his bactrim. Pt also c/o cough for the same amount of time. His CP is reproducible by coughing and taking deep breaths.    On exam, pt's rash has resolved.  His PERC score is normal.    Plan is to d/c home with plenty of rest. Pt understands and agrees with the plan, all questions answered.      EKG           EKG time: 0809  Rhythm/Rate: NSR, 87  P waves and MT: normal  QRS, axis: normal   ST and T waves: normal     Interpreted Contemporaneously by me, independently viewed  No prior for comparison.      Documentation assistance provided by anna Smith for Dr. Vaughn.  Information recorded by the rosaibvictoria was done at my direction and has been verified and validated by me.     Tia Smith  04/02/18 8143       Sloan Vaughn MD  04/02/18 2681

## 2018-04-02 NOTE — ED NOTES
Pt updated. Face appears calm and relaxed. Pt thanked for their patience.      Cally Rollins RN  04/02/18 7652

## 2018-04-02 NOTE — ED PROVIDER NOTES
EMERGENCY DEPARTMENT ENCOUNTER    CHIEF COMPLAINT  Chief Complaint: chest pain  History given by: patient  History limited by: N/A  Room Number: 11/11  PMD: JOHNATHAN Woods      HPI:  Pt is a 48 y.o. male who presents with chest pain. He reports that he was on bactrim DS for about 2 weeks and finished taking it on 3/27/18. He states that on 3/29/18, he awoke with diffuse itchy skin rash for which he was seen at [facility] ED and was given solumedrol. His sx improved temporarily after he received the solumedrol but returned later that day. He notes that consequently, he went to [facility] that evening and was given hydroxyzine, benadryl, and solumedrol.  After discharge from [facility], on the night of 3/29/18, he had onset of intermittent nonradiating central chest pain (described as tightness) that is exacerbated by coughing and taking deep breaths. He has also had dyspnea that is worse with exertion, cough, fatigue, and general malaise. He denies nausea, vomiting, sweating, dizziness, BLE swelling, fevers, chills, abd pain, pain and difficulty with urination, trouble swallowing, hx of HTN/diabetes/heart disease/hyperlipidmia/blood clots, and any other new lifestyle changes (no new medications except for those given at [facility] ED and [facility] on 3/29/18, soaps, detergents, food, clothes, etc). He states that the skin rash has resolved. Past Medical History of MRSA and kidney stone.     Duration: started on 3/29/18  Timing: intermittent   Location: central chest  Radiation: none   Quality: tightness  Intensity/Severity: moderate  Progression: unchanged  Associated Symptoms: dyspnea that is worse with exertion, cough, fatigue, general malaise   Aggravating Factors: coughing, taking deep breaths  Alleviating Factors: none  Previous Episodes: none  Treatment before arrival: none mentioned     PAST MEDICAL HISTORY  Active Ambulatory Problems     Diagnosis Date Noted   • Leal fracture 11/09/2016     Resolved Ambulatory  Problems     Diagnosis Date Noted   • Low grade fever 12/14/2016     Past Medical History:   Diagnosis Date   • Kidney stone    • MRSA (methicillin resistant Staphylococcus aureus)        PAST SURGICAL HISTORY  Past Surgical History:   Procedure Laterality Date   • GASTRIC BANDING     • GASTRIC BANDING REMOVAL     • SHOULDER SURGERY     • VASECTOMY         FAMILY HISTORY  Family History   Problem Relation Age of Onset   • COPD Mother    • Cancer Father        SOCIAL HISTORY  Social History     Social History   • Marital status:      Spouse name: N/A   • Number of children: N/A   • Years of education: N/A     Occupational History   • Not on file.     Social History Main Topics   • Smoking status: Never Smoker   • Smokeless tobacco: Former User     Types: Chew   • Alcohol use Yes      Comment: occasional   • Drug use: Unknown   • Sexual activity: Defer     Other Topics Concern   • Not on file     Social History Narrative   • No narrative on file         ALLERGIES  Bactrim [sulfamethoxazole-trimethoprim]    REVIEW OF SYSTEMS  Review of Systems   Constitutional: Positive for fatigue. Negative for chills, diaphoresis and fever.        General malaise   HENT: Negative for sore throat.    Respiratory: Positive for cough, chest tightness (central chest pain (described as tightness)) and shortness of breath (dyspnea that is worse with exertion).    Cardiovascular: Positive for chest pain (central chest pain (described as tightness)). Negative for leg swelling.   Gastrointestinal: Negative for abdominal pain, diarrhea, nausea and vomiting.   Genitourinary: Negative for difficulty urinating and dysuria.   Musculoskeletal: Negative for back pain.   Skin: Negative for rash (occurred last week, resolved).   Neurological: Negative for dizziness.   Psychiatric/Behavioral: The patient is not nervous/anxious.        PHYSICAL EXAM  ED Triage Vitals   Temp Heart Rate Resp BP SpO2   04/02/18 0742 04/02/18 0742 04/02/18 0746  04/02/18 0746 04/02/18 0742   98.9 °F (37.2 °C) 108 20 154/100 100 % WNL     Physical Exam   Constitutional: He is oriented to person, place, and time and well-developed, well-nourished, and in no distress. No distress.   HENT:   Head: Normocephalic and atraumatic.   Mouth/Throat: Oropharynx is clear and moist and mucous membranes are normal.   No facial swelling, no intraoral swelling, speaks in full sentences, handling secretions without difficulty, no drooling    Eyes: EOM are normal. No scleral icterus.   Neck: Normal range of motion.   Cardiovascular: Normal rate, regular rhythm and normal heart sounds.    Pulmonary/Chest: Effort normal. No respiratory distress. He has wheezes in the left lower field. He exhibits no tenderness.   O2 sat is 100% on room air   Abdominal: Soft. There is no tenderness. There is no rebound and no guarding.   Musculoskeletal: Normal range of motion. He exhibits no edema (no BLE edema).   Neurological: He is alert and oriented to person, place, and time. He has normal motor skills and normal sensation.   Skin: Skin is warm and dry. No rash noted.   Psychiatric: Mood and affect normal.   Nursing note and vitals reviewed.      LAB RESULTS  Recent Results (from the past 24 hour(s))   Light Blue Top    Collection Time: 04/02/18  8:36 AM   Result Value Ref Range    Extra Tube hold for add-on    Green Top (Gel)    Collection Time: 04/02/18  8:36 AM   Result Value Ref Range    Extra Tube Hold for add-ons.    Lavender Top    Collection Time: 04/02/18  8:36 AM   Result Value Ref Range    Extra Tube hold for add-on    Gold Top - SST    Collection Time: 04/02/18  8:36 AM   Result Value Ref Range    Extra Tube Hold for add-ons.    Comprehensive Metabolic Panel    Collection Time: 04/02/18  8:36 AM   Result Value Ref Range    Glucose 105 (H) 65 - 99 mg/dL    BUN 16 6 - 20 mg/dL    Creatinine 0.94 0.76 - 1.27 mg/dL    Sodium 139 136 - 145 mmol/L    Potassium 4.2 3.5 - 5.2 mmol/L    Chloride 100 98  - 107 mmol/L    CO2 30.3 (H) 22.0 - 29.0 mmol/L    Calcium 8.6 8.6 - 10.5 mg/dL    Total Protein 6.5 6.0 - 8.5 g/dL    Albumin 3.70 3.50 - 5.20 g/dL    ALT (SGPT) 19 1 - 41 U/L    AST (SGOT) 14 1 - 40 U/L    Alkaline Phosphatase 57 39 - 117 U/L    Total Bilirubin 0.6 0.1 - 1.2 mg/dL    eGFR Non African Amer 86 >60 mL/min/1.73    Globulin 2.8 gm/dL    A/G Ratio 1.3 g/dL    BUN/Creatinine Ratio 17.0 7.0 - 25.0    Anion Gap 8.7 mmol/L   D-dimer, Quantitative    Collection Time: 04/02/18  8:36 AM   Result Value Ref Range    D-Dimer, Quantitative 0.32 0.00 - 0.49 MCGFEU/mL   Troponin    Collection Time: 04/02/18  8:36 AM   Result Value Ref Range    Troponin T <0.010 0.000 - 0.030 ng/mL   CBC Auto Differential    Collection Time: 04/02/18  8:36 AM   Result Value Ref Range    WBC 7.95 4.50 - 10.70 10*3/mm3    RBC 4.82 4.60 - 6.00 10*6/mm3    Hemoglobin 15.3 13.7 - 17.6 g/dL    Hematocrit 46.9 40.4 - 52.2 %    MCV 97.3 (H) 79.8 - 96.2 fL    MCH 31.7 27.0 - 32.7 pg    MCHC 32.6 32.6 - 36.4 g/dL    RDW 13.2 11.5 - 14.5 %    RDW-SD 46.8 37.0 - 54.0 fl    MPV 10.5 6.0 - 12.0 fL    Platelets 135 (L) 140 - 500 10*3/mm3    Neutrophil % 56.7 42.7 - 76.0 %    Lymphocyte % 26.4 19.6 - 45.3 %    Monocyte % 12.2 (H) 5.0 - 12.0 %    Eosinophil % 1.3 0.3 - 6.2 %    Basophil % 0.3 0.0 - 1.5 %    Immature Grans % 3.1 (H) 0.0 - 0.5 %    Neutrophils, Absolute 4.51 1.90 - 8.10 10*3/mm3    Lymphocytes, Absolute 2.10 0.90 - 4.80 10*3/mm3    Monocytes, Absolute 0.97 0.20 - 1.20 10*3/mm3    Eosinophils, Absolute 0.10 0.00 - 0.70 10*3/mm3    Basophils, Absolute 0.02 0.00 - 0.20 10*3/mm3    Immature Grans, Absolute 0.25 (H) 0.00 - 0.03 10*3/mm3       I ordered the above labs and reviewed the results    RADIOLOGY  XR Chest 2 View (Final result) Result time: 04/02/18 09:52:07   Final result by Yovani Freeman MD (04/02/18 09:52:07)   Narrative:   TWO-VIEW CHEST     HISTORY: 48-year-old male with shortness of breath cough chest pain  and  fever     COMPARISON: None available     FINDINGS:  1. Normal exam for age and body habitus, lungs are clear of an active  process.     This report was finalized on 4/2/2018 9:52 AM by Dr. Yovani Freeman MD.          I ordered the above noted radiological studies and reviewed the images on the PACS system.        EKG    EKG was interpreted by Dr. Vaughn. See Dr Vaughn's note for EKG interpretation.       MEDICAL RECORD REVIEW    [facility] note from 3/29/18- Pt was seen at [facility] ED on 3/29/18 for allergic reaction with skin rash. At the time, he received solumedrol and was discharged after sx had improved. Later that day, pt was seen at [facility] for recurrent sx. He was given hydroxyzine, benadryl, and solumedrol and was discharged with prescriptions for hydroxyzine and epipen.         PROGRESS AND CONSULTS  0857- Ordered solumedrol and albuterol nebulizer for dyspnea. Ordered CXR, blood work, d-dimer, troponin, and EKG for further evaluation.   1007- Reviewed pt's history and workup with Dr. Vaughn.  At bedside evaluation, they agree with the plan of care.  1014- Rechecked pt. On reexam, after pt received breathing treatment, wheezing has improved. Lungs are clear to auscultation bilaterally. Pt is in no acute distress/no respiratory distress. Reviewed implications of results (including CXR findings (no acute process), stable EKG findings, normal d-dimer, negative troponin, normal WBC count, otherwise stable labs), diagnosis, meds, responsibility to follow up, warning signs and symptoms of possible worsening, potential complications and reasons to return to ER with patient.  Discussed all results and noted any abnormalities with patient.  Discussed absolute need to follow up with PMD and referred cardiologist for recheck of abnormalities and condition and for further management. Instructed pt to never take bactrim DS again in case it potentially contributed to his sx.   Discussed plan for discharge, as there is no  "emergent indication for admission.  Pt is agreeable and understands need for follow up and repeat testing.  Pt is aware that discharge does not mean that nothing is wrong but it indicates no emergency is present.  Pt is discharged with instructions to follow up with primary care doctor to have their blood pressure rechecked.       DIAGNOSIS  Final diagnoses:   Atypical chest pain   Dyspnea (resolved), unspecified type       FOLLOW UP   Jeimy Bar, JOHNATHAN  [address]  [place] [address]  [facility] [id number] [phone]    Call today      Eliud Limon MD  [address]  [address]  [facility] [id number] [phone]    Call   As needed      COURSE & MEDICAL DECISION MAKING  Pertinent Labs and Imaging studies that were ordered and reviewed are noted above.  Results were reviewed/discussed with the patient and they were also made aware of online assess.   Pt also made aware that some labs, such as cultures, will not be resulted during ER visit and follow up with PMD is necessary.     MEDICATIONS GIVEN IN ER  Medications   sodium chloride 0.9 % flush 10 mL (not administered)   sodium chloride 0.9 % flush 10 mL (not administered)   albuterol (PROVENTIL) nebulizer solution 0.083% 2.5 mg/3mL (2.5 mg Nebulization Given 4/2/18 0928)   methylPREDNISolone sodium succinate (SOLU-Medrol) injection 125 mg (125 mg Intravenous Given 4/2/18 0917)       /83   Pulse 77   Temp 98.9 °F (37.2 °C)   Resp 20   Ht 180.3 cm (71\")   Wt (!) 151 kg (333 lb)   SpO2 96%   BMI 46.44 kg/m²       I personally reviewed the past medical history, past surgical history, social history, family history, current medications and allergies as they appear in this chart.  The scribe's note accurately reflects the work and decisions made by me.     Documentation assistance provided by anna Roman for EMERSON Christianson on 4/2/2018 at 10:51 AM. Information recorded by the scribe was done at my direction and has been verified " and validated by me.       Shahida Roman  04/02/18 1321       Zoie Segovia, JOHNATHAN  04/02/18 1629

## 2018-04-02 NOTE — ED TRIAGE NOTES
"Patient to er with c/o shortness of breath and chest pain. Patient stated this started Friday after having a reaction to bactrim. Patient stated the chest pain and shortness of breath is getting worse.\"   "

## 2018-04-02 NOTE — DISCHARGE INSTRUCTIONS
Continue current home medications  Do not take Bactrim again  Follow up with pmd and cardiology as needed  Return to er for fever, chills, shortness of air, chest pain, or any new or worsening symptoms

## 2020-01-20 ENCOUNTER — OFFICE (OUTPATIENT)
Dept: URBAN - METROPOLITAN AREA CLINIC 42 | Facility: CLINIC | Age: 50
End: 2020-01-20

## 2020-01-20 VITALS — WEIGHT: 315 LBS | HEIGHT: 71 IN

## 2020-01-20 DIAGNOSIS — K30 FUNCTIONAL DYSPEPSIA: ICD-10-CM

## 2020-01-20 DIAGNOSIS — R14.0 ABDOMINAL DISTENSION (GASEOUS): ICD-10-CM

## 2020-01-20 DIAGNOSIS — K31.84 GASTROPARESIS: ICD-10-CM

## 2020-01-20 DIAGNOSIS — R13.10 DYSPHAGIA, UNSPECIFIED: ICD-10-CM

## 2020-01-20 PROCEDURE — 99204 OFFICE O/P NEW MOD 45 MIN: CPT

## 2021-04-06 ENCOUNTER — BULK ORDERING (OUTPATIENT)
Dept: CASE MANAGEMENT | Facility: OTHER | Age: 51
End: 2021-04-06

## 2021-04-06 DIAGNOSIS — Z23 IMMUNIZATION DUE: ICD-10-CM
